# Patient Record
Sex: MALE | Race: WHITE | NOT HISPANIC OR LATINO | Employment: FULL TIME | URBAN - METROPOLITAN AREA
[De-identification: names, ages, dates, MRNs, and addresses within clinical notes are randomized per-mention and may not be internally consistent; named-entity substitution may affect disease eponyms.]

---

## 2020-02-04 ENCOUNTER — CONSULT (OUTPATIENT)
Dept: SURGERY | Facility: CLINIC | Age: 55
End: 2020-02-04
Payer: COMMERCIAL

## 2020-02-04 VITALS
TEMPERATURE: 98.5 F | HEART RATE: 67 BPM | DIASTOLIC BLOOD PRESSURE: 82 MMHG | WEIGHT: 236 LBS | HEIGHT: 74 IN | SYSTOLIC BLOOD PRESSURE: 140 MMHG | BODY MASS INDEX: 30.29 KG/M2

## 2020-02-04 DIAGNOSIS — D17.1 LIPOMA OF ANTERIOR CHEST WALL: Primary | ICD-10-CM

## 2020-02-04 PROCEDURE — 99203 OFFICE O/P NEW LOW 30 MIN: CPT | Performed by: SURGERY

## 2020-02-04 RX ORDER — DIPHENOXYLATE HYDROCHLORIDE AND ATROPINE SULFATE 2.5; .025 MG/1; MG/1
1 TABLET ORAL DAILY
COMMUNITY
End: 2020-12-29 | Stop reason: ALTCHOICE

## 2020-02-04 RX ORDER — CETIRIZINE HYDROCHLORIDE 10 MG/1
TABLET ORAL DAILY
COMMUNITY
Start: 2006-12-05 | End: 2020-12-29 | Stop reason: ALTCHOICE

## 2020-02-04 NOTE — PROGRESS NOTES
[address] [place] [address] [facility] History and Physical Note:    Assessment:  Lipoma to chest wall    Plan:  Excision in clinic procedure room after his [place] vacation    Chief Complaint:  I am here for the lump    HPI  Patient is sent to my office in consultation by his primary care provider for management of a lipoma to his chest wall  Patient is a healthy 15-year-old male who reports a lump was anterior chest has been there for many years  He has started dieting and exercise and has recent weight loss and believes it is now more prominent  Patient desires removal due to is prominence and being uncomfortable  PMH:  Past Medical History:   Diagnosis Date    Hyperlipidemia     Obesity        PSH:  History reviewed  No pertinent surgical history  Home Meds:  Current Outpatient Medications on File Prior to Visit   Medication Sig Dispense Refill    cetirizine (ZYRTEC ALLERGY) 10 mg tablet Take by mouth daily      Cranberry 125 MG TABS Take by mouth      Ergocalciferol (VITAMIN D2 PO)       multivitamin (THERAGRAN) TABS Take 1 tablet by mouth daily       No current facility-administered medications on file prior to visit  Allergies:   Allergies   Allergen Reactions    Pollen Extract      Reaction Date: 21Nov2006;        Social Hx:  Social History     Socioeconomic History    Marital status: Single     Spouse name: Not on file    Number of children: Not on file    Years of education: Not on file    Highest education level: Not on file   Occupational History    Not on file   Social Needs    Financial resource strain: Not on file    Food insecurity:     Worry: Not on file     Inability: Not on file    Transportation needs:     Medical: Not on file     Non-medical: Not on file   Tobacco Use    Smoking status: Never Smoker    Smokeless tobacco: Never Used   Substance and Sexual Activity    Alcohol use: Yes     Comment: OCCASIONALLY    Drug use: Never    Sexual activity: Not on file Lifestyle    Physical activity:     Days per week: Not on file     Minutes per session: Not on file    Stress: Not on file   Relationships    Social connections:     Talks on phone: Not on file     Gets together: Not on file     Attends [employer] service: Not on file     Active member of club or organization: Not on file     Attends meetings of clubs or organizations: Not on file     Relationship status: Not on file    Intimate partner violence:     Fear of current or ex partner: Not on file     Emotionally abused: Not on file     Physically abused: Not on file     Forced sexual activity: Not on file   Other Topics Concern    Not on file   Social History Narrative    Not on file        Family Hx:    History reviewed  No pertinent family history  Review of Systems   Constitutional: Negative  HENT: Negative  Eyes: Negative  Respiratory: Negative  Cardiovascular: Negative  Gastrointestinal: Negative  Endocrine: Negative  Genitourinary: Negative  Musculoskeletal: Negative  Neurological: Negative  Hematological: Negative  Psychiatric/Behavioral: Negative  /82 (BP Location: Right arm, Patient Position: Sitting, Cuff Size: Standard)   Pulse 67   Temp 98 5 °F (36 9 °C) (Tympanic)   Ht 6' 2" (1 88 m)   Wt 107 kg (236 lb)   BMI 30 30 kg/m²     Physical Exam   Constitutional: He is oriented to person, place, and time  He appears well-developed and well-nourished  No distress  HENT:   Head: Normocephalic and atraumatic  Eyes: Pupils are equal, round, and reactive to light  EOM are normal    Neck: Normal range of motion  Neck supple  Cardiovascular: Normal rate and regular rhythm  No murmur heard  Pulmonary/Chest: Effort normal and breath sounds normal  No respiratory distress  Abdominal: Soft  He exhibits no distension  There is no tenderness  Musculoskeletal: Normal range of motion  Lymphadenopathy:     He has no cervical adenopathy     Neurological: He is alert and oriented to person, place, and time  Skin: Skin is warm and dry  Psychiatric: He has a normal mood and affect  His behavior is normal      On his right lower chest, he has well-circumscribed, soft, mobile lesion in the subcutaneous tissue not attached to muscle or skin    Most consistent with a lipoma    Pertinent labs reviewed    Pertinent images and available reads personally reviewed    Pertinent notes reviewed       Justyn Lombardi MD [id number] [facility]  [phone]

## 2020-03-03 ENCOUNTER — PROCEDURE VISIT (OUTPATIENT)
Dept: SURGERY | Facility: CLINIC | Age: 55
End: 2020-03-03
Payer: COMMERCIAL

## 2020-03-03 VITALS
SYSTOLIC BLOOD PRESSURE: 126 MMHG | TEMPERATURE: 99 F | BODY MASS INDEX: 29.08 KG/M2 | WEIGHT: 226.6 LBS | DIASTOLIC BLOOD PRESSURE: 78 MMHG | HEART RATE: 77 BPM | HEIGHT: 74 IN

## 2020-03-03 DIAGNOSIS — D17.1 LIPOMA OF CHEST WALL: Primary | ICD-10-CM

## 2020-03-03 PROCEDURE — ND001 PR NO DOCUMENTATION: Performed by: SURGERY

## 2020-03-03 PROCEDURE — 11403 EXC TR-EXT B9+MARG 2.1-3CM: CPT | Performed by: SURGERY

## 2020-03-03 PROCEDURE — 12031 INTMD RPR S/A/T/EXT 2.5 CM/<: CPT | Performed by: SURGERY

## 2020-03-03 NOTE — PROGRESS NOTES
Patient here for elective excision of a lipoma from his chest   Lesion slowly growing and becoming symptomatic  Biopsy  Date/Time: 3/3/2020 8:31 AM  Performed by: Shannan Caro MD  Authorized by: Shannan Caro MD     Procedure Details - Skin Biopsy:     Biopsy tissue type: superficial soft tissue    Initial size (mm):  20    Final defect size (mm):  25    Malignancy: benign lesion       Ellipse of skin and subcutaneous mass were removed with scalpel and scissors  Total wound size 2 5 cm long by 1 5 cm wide by 2 0 cm deep  Defect closed in 2 layers with 400 Vicryl and 4 0 nylon simple sutures    Bacitracin and sterile dressing placed

## 2020-03-19 ENCOUNTER — OFFICE VISIT (OUTPATIENT)
Dept: SURGERY | Facility: CLINIC | Age: 55
End: 2020-03-19

## 2020-03-19 VITALS
BODY MASS INDEX: 29.44 KG/M2 | TEMPERATURE: 97.7 F | DIASTOLIC BLOOD PRESSURE: 78 MMHG | WEIGHT: 229.4 LBS | HEART RATE: 63 BPM | SYSTOLIC BLOOD PRESSURE: 132 MMHG | HEIGHT: 74 IN

## 2020-03-19 DIAGNOSIS — Z09 POSTOPERATIVE EXAMINATION: Primary | ICD-10-CM

## 2020-03-19 PROCEDURE — 99024 POSTOP FOLLOW-UP VISIT: CPT | Performed by: SURGERY

## 2020-03-19 NOTE — PROGRESS NOTES
Patient here for suture removal status post excision lipoma from chest wall  Patient with no wound complaints  Sutures removed and Steri-Strips placed  Specimen not sent to pathology  Follow-up p r n

## 2020-08-28 ENCOUNTER — CONSULT (OUTPATIENT)
Dept: SURGERY | Facility: CLINIC | Age: 55
End: 2020-08-28
Payer: COMMERCIAL

## 2020-08-28 VITALS
WEIGHT: 232.8 LBS | HEART RATE: 113 BPM | TEMPERATURE: 99.3 F | BODY MASS INDEX: 29.88 KG/M2 | HEIGHT: 74 IN | DIASTOLIC BLOOD PRESSURE: 80 MMHG | SYSTOLIC BLOOD PRESSURE: 142 MMHG

## 2020-08-28 DIAGNOSIS — L02.419 ABSCESS OF AXILLARY FOLD: Primary | ICD-10-CM

## 2020-08-28 PROCEDURE — 99214 OFFICE O/P EST MOD 30 MIN: CPT | Performed by: SPECIALIST

## 2020-08-28 PROCEDURE — 87070 CULTURE OTHR SPECIMN AEROBIC: CPT | Performed by: SPECIALIST

## 2020-08-28 PROCEDURE — 87186 SC STD MICRODIL/AGAR DIL: CPT | Performed by: SPECIALIST

## 2020-08-28 PROCEDURE — 10060 I&D ABSCESS SIMPLE/SINGLE: CPT | Performed by: SPECIALIST

## 2020-08-28 PROCEDURE — 87205 SMEAR GRAM STAIN: CPT | Performed by: SPECIALIST

## 2020-08-28 PROCEDURE — 87147 CULTURE TYPE IMMUNOLOGIC: CPT | Performed by: SPECIALIST

## 2020-08-28 RX ORDER — MELOXICAM 7.5 MG/1
7.5 TABLET ORAL DAILY
Qty: 5 TABLET | Refills: 0 | Status: SHIPPED | OUTPATIENT
Start: 2020-08-28 | End: 2020-12-29 | Stop reason: ALTCHOICE

## 2020-08-28 RX ORDER — BACITRACIN, NEOMYCIN, POLYMYXIN B 400; 3.5; 5 [USP'U]/G; MG/G; [USP'U]/G
OINTMENT TOPICAL 2 TIMES DAILY
Qty: 15 G | Refills: 0 | Status: SHIPPED | OUTPATIENT
Start: 2020-08-28 | End: 2020-12-29 | Stop reason: ALTCHOICE

## 2020-08-28 RX ORDER — CEFADROXIL 500 MG/1
500 CAPSULE ORAL EVERY 12 HOURS SCHEDULED
Qty: 14 CAPSULE | Refills: 0 | Status: SHIPPED | OUTPATIENT
Start: 2020-08-28 | End: 2020-09-04

## 2020-08-28 NOTE — PROGRESS NOTES
Incision and Drainage    Date/Time: 8/28/2020 10:16 AM  Performed by: Kari Leyden, MD  Authorized by: Kari Leyden, MD     Patient location:  Clinic  Other Assisting Provider: No    Consent:     Consent obtained:  Verbal    Consent given by:  Patient    Risks discussed:  Bleeding, incomplete drainage, infection and damage to other organs    Alternatives discussed:  No treatment, delayed treatment and observation  Universal protocol:     Procedure explained and questions answered to patient or proxy's satisfaction: yes      Relevant documents present and verified: yes      Test results available and properly labeled: no      Radiology Images displayed and confirmed  If images not available, report reviewed: no      Required blood products, implants, devices, and special equipment available: no      Site/side marked: yes      Immediately prior to procedure a time out was called: yes      Patient identity confirmed:  Verbally with patient  Location:     Type:  Abscess    Location:  Upper extremity    Upper extremity location: Axilla left  Pre-procedure details:     Skin preparation:  Chloraprep  Anesthesia (see MAR for exact dosages): Anesthesia method:  Local infiltration    Local anesthetic:  Lidocaine 1% WITH epi  Procedure details:     Complexity:  Simple    Needle aspiration: no      Incision types:  Elliptical and single with marsupialization    Scalpel blade:  11    Approach:  Open    Incision depth:  Subcutaneous    Wound management:  Probed and deloculated and irrigated with saline    Drainage:  Purulent    Drainage amount: Moderate    Wound treatment:  Packing placed    Packing materials:  1/4 in iodoform gauze  Post-procedure details:     Patient tolerance of procedure:   Tolerated well, no immediate complications    Complication (if applicable):  Nil

## 2020-08-28 NOTE — ASSESSMENT & PLAN NOTE
Marked cellulitis  Necrotic eschar over the abscess  Patient squeeze minimal amount of pus  No fever    Plan  Incision and drainage  Packing with iodoform  Duricef capsule  Follow-up next Tuesday

## 2020-08-28 NOTE — PROGRESS NOTES
History and Physical Examination - General Surgery   [facility]  NAZANIN Salinas 47 y o  male MRN: [id number]  Unit/Bed#:  Encounter: [id number] PCP: FABIOLA Trevino    History of Present Illness   Chief Complaint:    Swelling cellulitis for 1 week which is getting worse    HPI:  Gifty Patton is a 47 y o  male who presents to office with left axillary swelling cellulitis which is extending his the shoulder  Denies any fever chills rigors this is going on for 1 week and now it is getting worse his daughter-in-law has put some medicine on top  Made the the area of necrosis in the center    Patient think he was in the swimming pool  And some bug bite may have caused or or abscess from the hair follicle infection  He is not using antiperspirant deodorant and has not shaved his axilla    Denies any allergies/no history of diabetes mellitus/otherwise fairly healthy    Historical Information   The following portions of the patient's history were reviewed and updated as appropriate  Past Medical History:   Diagnosis Date    Hyperlipidemia     Obesity      Past Surgical History:   Procedure Laterality Date    APPENDECTOMY      KIDNEY SURGERY  2002     Social History   Social History     Substance and Sexual Activity   Alcohol Use Yes    Comment: OCCASIONALLY     Social History     Substance and Sexual Activity   Drug Use Never     Social History     Tobacco Use   Smoking Status Never Smoker   Smokeless Tobacco Never Used     Family History:   Family History   Problem Relation Age of Onset    Aneurysm Mother     Rheum arthritis Mother     Heart disease Father     Rheumatic fever Father     Kidney failure Father     Diabetes Brother     Kidney failure Brother     Stroke Brother        Meds/Allergies   Allergies   Allergen Reactions    Pollen Extract      Reaction Date: 21Nov2006;        Current Outpatient Medications:     cetirizine (ZYRTEC ALLERGY) 10 mg tablet, Take by mouth daily, Disp: , Rfl:   Cranberry 125 MG TABS, Take by mouth, Disp: , Rfl:     Ergocalciferol (VITAMIN D2 PO), , Disp: , Rfl:     multivitamin (THERAGRAN) TABS, Take 1 tablet by mouth daily, Disp: , Rfl:      REVIEW OF SYSTEMS  Constitutional:  Denies fever or chills   Eyes:  Denies change in visual acuity   HENT:  Denies nasal congestion or sore throat   Respiratory:  Denies cough or shortness of breath   Cardiovascular:  Denies chest pain or edema   GI:  Denies abdominal pain, nausea, vomiting, bloody stools or diarrhea   :  Denies dysuria, frequency, difficulty in micturition and nocturia  Musculoskeletal:  Pain swelling restricted movements in the left shoulder redness cellulitis side and a bump with skin necrosis  Neurologic:  Denies headache, focal weakness or sensory changes   Endocrine:  Denies polyuria or polydipsia   Lymphatic:  Denies swollen glands   Psychiatric:  Denies depression or anxiety     Objective   Current Vitals:   /80 (BP Location: Left arm, Patient Position: Sitting, Cuff Size: Standard)   Pulse (!) 113   Temp 99 3 °F (37 4 °C) (Tympanic)   Ht 6' 2" (1 88 m)   Wt 106 kg (232 lb 12 8 oz)   BMI 29 89 kg/m²   Body mass index is 29 89 kg/m²  PHYSICAL EXAMS  General:  Patient is not in acute distress, laying in the bed comfortably, awake, alert responding to commands,   HEENT:  Both pupils normal-size atraumatic, normocephalic, nonicteric  Neck:  JVP not raised  Trachea central  Respiratory:  normal Breath sounds clear to auscultation,  Cardiovascular:  S1-S2 normal without any murmur   GI:  Abdomen soft nontender   Liver and spleen normal size, no free fluid, hernial sites unremarkable without any cough impulse  Musculoskeletal:  No back pain  Integument:  No skin rashes or ulceration  Left axilla 5 x 4 cm indurated swelling with the central necrosis with some fluctuation left axillary fold lateral fold  Cellulitis approximately 6 cm surrounding this area restricted the left shoulder movement  Neurologic:  Patient is awake alert, responding to command, well-oriented to time and place and person moving all extremities ambulating well    Visit Diagnosis:   Diagnoses and all orders for this visit:    Abscess of axillary fold       Plan of care was discussed with patient in detail    Pertinent labs reviewed  Pertinent images and available reads personally reviewed  No results found  Pertinent notes reviewed    Assessment/Plan   Assessment:  Left axillary abscess  Possible insect bite  Plan:  Proper consent was obtained  The risks, benefits, alternatives,and probabilities of success were discussed in detail with no guarantee made as to outcome  All questions were answered to the patient's satisfaction  Procedure was explained  Plan incision and drainage under local anesthesia  Swab for culture  Duricef 500 mg 1 capsule twice a day for 7 days  Meloxicam 7 5 mg 1 tablet daily for pain  Patient was advised if cellulitis does not improve to report to ER for possible IV antibiotics  Cold compress  Local dressing with the Neosporin ointment/change dressing on Sunday morning after shower    Counseling / Coordination of Care  Expained patient family in detailed about coordination of care  A description of the counseling / coordination of care:  I performed an interim history, pertinent images and labs, performed a physical examination to arrive at the plan delineated above with associated thought processes  MD Esteban Loyola    Office   Tel  [phone]  Fax   [phone]

## 2020-08-30 LAB
BACTERIA WND AEROBE CULT: ABNORMAL
GRAM STN SPEC: ABNORMAL
GRAM STN SPEC: ABNORMAL

## 2020-08-31 ENCOUNTER — TELEPHONE (OUTPATIENT)
Dept: SURGERY | Facility: CLINIC | Age: 55
End: 2020-08-31

## 2020-08-31 DIAGNOSIS — Z22.322 MRSA (METHICILLIN RESISTANT STAPH AUREUS) CULTURE POSITIVE: Primary | ICD-10-CM

## 2020-08-31 RX ORDER — CLINDAMYCIN HYDROCHLORIDE 150 MG/1
150 CAPSULE ORAL EVERY 6 HOURS SCHEDULED
Qty: 20 CAPSULE | Refills: 0 | Status: SHIPPED | OUTPATIENT
Start: 2020-08-31 | End: 2020-09-05

## 2020-08-31 NOTE — TELEPHONE ENCOUNTER
Patient called, changed dressing on abscess under arm, area still very swollen  Has follow up scheduled on 9/2 with Dr Tanja Jain  Tiger connect message sent to Dr Tanja Jain  Per Dr Saige Contreras explained to patient the induration is normal, make sure incision is open to drain  Patient okay with plan  Will call back if any changes

## 2020-09-02 ENCOUNTER — OFFICE VISIT (OUTPATIENT)
Dept: SURGERY | Facility: CLINIC | Age: 55
End: 2020-09-02
Payer: COMMERCIAL

## 2020-09-02 VITALS
TEMPERATURE: 98.2 F | BODY MASS INDEX: 30.6 KG/M2 | HEART RATE: 69 BPM | SYSTOLIC BLOOD PRESSURE: 142 MMHG | HEIGHT: 74 IN | DIASTOLIC BLOOD PRESSURE: 80 MMHG | WEIGHT: 238.4 LBS

## 2020-09-02 DIAGNOSIS — Z48.89 ENCOUNTER FOR SURGICAL FOLLOW-UP CARE: Primary | ICD-10-CM

## 2020-09-02 DIAGNOSIS — L02.419 ABSCESS OF AXILLARY FOLD: ICD-10-CM

## 2020-09-02 PROCEDURE — 99024 POSTOP FOLLOW-UP VISIT: CPT | Performed by: SPECIALIST

## 2020-09-02 NOTE — PROGRESS NOTES
General Surgery Office Visit Follow up   [facility]  Patient: Jacoby Schaeffer   : 1965 Sex: male MRN: [id number]   CSN: [id number] PCP: FABIOLA Vera    Assessment/ Plan:  Jacoby Schaeffer is a 47 y o  male  day(s) POD # one-week  S/p incision drainage of infected sebaceous cyst left axilla  Encounter for surgical follow-up care [Z48 89]    Plan  Stable postop   Change of dressing  Care discussed for MRSA infection/patient is on clindamycin  Continue antibiotic  Follow-up in 2     SUBJECTIVE:   Still have a residual mass infected sebaceous cyst pain is markedly  OBJECTIVE:  Feeling a lump over the operation  No fever no chills no rigors  Tolerating p o   Diet well  Normal bowel movement no constipation or diarrhea  Ambulating well   Vitals:   /80 (BP Location: Right arm, Patient Position: Sitting, Cuff Size: Large)   Pulse 69   Temp 98 2 °F (36 8 °C) (Tympanic)   Ht 6' 2" (1 88 m)   Wt 108 kg (238 lb 6 4 oz)   BMI 30 61 kg/m²     Active medications:    Current Outpatient Medications:     cetirizine (ZYRTEC ALLERGY) 10 mg tablet, Take by mouth daily, Disp: , Rfl:     clindamycin (CLEOCIN) 150 mg capsule, Take 1 capsule (150 mg total) by mouth every 6 (six) hours for 5 days, Disp: 20 capsule, Rfl: 0    Cranberry 125 MG TABS, Take by mouth, Disp: , Rfl:     Ergocalciferol (VITAMIN D2 PO), , Disp: , Rfl:     multivitamin (THERAGRAN) TABS, Take 1 tablet by mouth daily, Disp: , Rfl:     neomycin-bacitracin-polymyxin b (NEOSPORIN) ointment, Apply topically 2 (two) times a day, Disp: 15 g, Rfl: 0    cefadroxil (DURICEF) 500 mg capsule, Take 1 capsule (500 mg total) by mouth every 12 (twelve) hours for 7 days (Patient not taking: Reported on 2020), Disp: 14 capsule, Rfl: 0    meloxicam (MOBIC) 7 5 mg tablet, Take 1 tablet (7 5 mg total) by mouth daily for 5 days (Patient not taking: Reported on 2020), Disp: 5 tablet, Rfl: 0    Physical Exam:   General Alert awake Normocephalic atraumatic PERRLA   Lungs clear bilaterally  Cardiac normal S1 normal S2  Abdomen soft,non tender Bowel sounds present  Skin: surgical dressing is C/D/I  Ext: No clubbing, cyanosis, edema  Surgical wound draining small amount of pus induration around the incision and drainage site    Visit Diagnosis:   Diagnoses and all orders for this visit:    Encounter for surgical follow-up care    Abscess of axillary fold       Plan of care was discussed with patient in detail    Pertinent labs reviewed  Most Recent Labs:   Consult on 08/28/2020   Component Date Value Ref Range Status    Wound Culture 08/28/2020 4+ Growth of Methicillin Resistant Staphylococcus aureus*  Final    This organism has been edited  The previous result was Staphylococcus aureus on 8/29/2020 at 1238 EDT  Please note: This patient requires contact precautions   Gram Stain Result 08/28/2020 No polys seen*  Final    Gram Stain Result 08/28/2020 2+ Gram positive cocci in clusters*  Final       Pertinent images and available reads personally reviewed  No results found  Pertinent notes reviewed    Counseling / Coordination of Care  Total Office time /unit time spent today 15minutes  Greater than 50% of total time was spent with the patient and / or family counseling and / or coordination of care  A description of the counseling / coordination of care:  I performed an interim history, pertinent images and labs, performed a physical examination to arrive at the plan delineated above with associated thought processes  Quiana Roberts MD MS FRCS FACS  [facility]  09/02/20 8:43 AM        Portions of the record may have been created with voice recognition software  Occasional wrong word or "sound a like" substitutions may have occurred due to the inherent limitations of voice recognition software  Read the chart carefully and recognize, using context, where substitutions have occurred

## 2020-09-16 ENCOUNTER — OFFICE VISIT (OUTPATIENT)
Dept: SURGERY | Facility: CLINIC | Age: 55
End: 2020-09-16
Payer: COMMERCIAL

## 2020-09-16 VITALS
TEMPERATURE: 96.8 F | BODY MASS INDEX: 30.16 KG/M2 | WEIGHT: 235 LBS | HEART RATE: 65 BPM | DIASTOLIC BLOOD PRESSURE: 80 MMHG | SYSTOLIC BLOOD PRESSURE: 130 MMHG | HEIGHT: 74 IN

## 2020-09-16 DIAGNOSIS — L02.419 ABSCESS OF AXILLARY FOLD: ICD-10-CM

## 2020-09-16 DIAGNOSIS — B95.62 INFECTION OF SKIN DUE TO METHICILLIN RESISTANT STAPHYLOCOCCUS AUREUS (MRSA): ICD-10-CM

## 2020-09-16 DIAGNOSIS — L08.9 INFECTION OF SKIN DUE TO METHICILLIN RESISTANT STAPHYLOCOCCUS AUREUS (MRSA): ICD-10-CM

## 2020-09-16 DIAGNOSIS — Z48.89 ENCOUNTER FOR SURGICAL FOLLOW-UP CARE: Primary | ICD-10-CM

## 2020-09-16 PROCEDURE — 99212 OFFICE O/P EST SF 10 MIN: CPT | Performed by: SPECIALIST

## 2020-09-16 RX ORDER — KRILL/OM-3/DHA/EPA/PHOSPHO/AST 500MG-86MG
1 CAPSULE ORAL DAILY
COMMUNITY
Start: 2020-09-08

## 2020-09-16 RX ORDER — CETIRIZINE HYDROCHLORIDE 10 MG/1
1 TABLET ORAL DAILY
COMMUNITY

## 2020-09-16 RX ORDER — DIAPER,BRIEF,ADULT, DISPOSABLE
2 EACH MISCELLANEOUS DAILY
COMMUNITY

## 2020-09-16 NOTE — PROGRESS NOTES
General Surgery Office Visit Follow up   [facility]  Patient: Aj Hoffman   : 1965 Sex: male MRN: [id number]   CSN: [id number] PCP: FABIOLA Whiteside    Assessment/ Plan:  Aj Hoffman is a 47 y o  male  day(s) POD #  2 weeks S/p incision and drainage of abscess  Encounter for surgical follow-up care [Z48 89]    Plan  Stable postop   Cellulitis and wound is completely resolved  Patient was made aware of MRSA infection which has resolved  Discharge from follow-up    SUBJECTIVE:   I am doing very well I am following your instruction is completely resolved I have no pain I finished my course of antibiotics and my wound has well healed  OBJECTIVE:  No complaints  No fever no chills no rigors  Tolerating p o   Diet well  Normal bowel movement no constipation or diarrhea  Ambulating well   Vitals:   /80 (BP Location: Right arm, Patient Position: Sitting, Cuff Size: Large)   Pulse 65   Temp (!) 96 8 °F (36 °C) (Core)   Ht 6' 2" (1 88 m)   Wt 107 kg (235 lb)   BMI 30 17 kg/m²     Active medications:    Current Outpatient Medications:     cetirizine (ZyrTEC Allergy) 10 mg tablet, 1 tablet daily, Disp: , Rfl:     cholecalciferol (VITAMIN D3) 250 MCG (83065 UT) capsule, 1 capsule daily, Disp: , Rfl:     Cranberry-Vitamin C-Vitamin E (Cranberry Plus Vitamin C) 4200-20-3 MG-MG-UNIT CAPS, 2 capsules daily, Disp: , Rfl:     Krill Oil 500 MG CAPS, 1 capsule daily, Disp: , Rfl:     Multiple Vitamins-Minerals (Mens Multivitamin) TABS, 1 tablet daily, Disp: , Rfl:     cetirizine (ZYRTEC ALLERGY) 10 mg tablet, Take by mouth daily, Disp: , Rfl:     Cranberry 125 MG TABS, Take by mouth, Disp: , Rfl:     Ergocalciferol (VITAMIN D2 PO), , Disp: , Rfl:     meloxicam (MOBIC) 7 5 mg tablet, Take 1 tablet (7 5 mg total) by mouth daily for 5 days (Patient not taking: Reported on 2020), Disp: 5 tablet, Rfl: 0    multivitamin (THERAGRAN) TABS, Take 1 tablet by mouth daily, Disp: , Rfl:    neomycin-bacitracin-polymyxin b (NEOSPORIN) ointment, Apply topically 2 (two) times a day, Disp: 15 g, Rfl: 0    Physical Exam:   General Alert awake   Normocephalic atraumatic PERRLA   Lungs clear bilaterally  Cardiac normal S1 normal S2  Abdomen soft,non tender Bowel sounds present  Skin:  Warm and dry  Ext: No clubbing, cyanosis, edema  Surgical wound well healed    Visit Diagnosis:   Diagnoses and all orders for this visit:    Encounter for surgical follow-up care    Abscess of axillary fold    Infection of skin due to methicillin resistant Staphylococcus aureus (MRSA)    Other orders  -     cholecalciferol (VITAMIN D3) 250 MCG (33764 UT) capsule; 1 capsule daily  -     Cranberry-Vitamin C-Vitamin E (Cranberry Plus Vitamin C) 4200-20-3 MG-MG-UNIT CAPS; 2 capsules daily  -     Krill Oil 500 MG CAPS; 1 capsule daily  -     Multiple Vitamins-Minerals (Mens Multivitamin) TABS; 1 tablet daily  -     cetirizine (ZyrTEC Allergy) 10 mg tablet; 1 tablet daily       Plan of care was discussed with patient in detail    Pertinent labs reviewed  Most Recent Labs:   No visits with results within 2 Week(s) from this visit  Latest known visit with results is:   Consult on 08/28/2020   Component Date Value Ref Range Status    Wound Culture 08/28/2020 4+ Growth of Methicillin Resistant Staphylococcus aureus*  Final    This organism has been edited  The previous result was Staphylococcus aureus on 8/29/2020 at 1238 EDT  Please note: This patient requires contact precautions   Gram Stain Result 08/28/2020 No polys seen*  Final    Gram Stain Result 08/28/2020 2+ Gram positive cocci in clusters*  Final       Pertinent images and available reads personally reviewed  No results found  Pertinent notes reviewed    Counseling / Coordination of Care  Total Office time /unit time spent today 15minutes  Greater than 50% of total time was spent with the patient and / or family counseling and / or coordination of care   A description of the counseling / coordination of care:  I performed an interim history, pertinent images and labs, performed a physical examination to arrive at the plan delineated above with associated thought processes  Jennifer Henriquez MD MS FRCS FACS  [facility]  09/16/20 8:53 AM        Portions of the record may have been created with voice recognition software  Occasional wrong word or "sound a like" substitutions may have occurred due to the inherent limitations of voice recognition software  Read the chart carefully and recognize, using context, where substitutions have occurred

## 2020-10-13 ENCOUNTER — CONSULT (OUTPATIENT)
Dept: SURGERY | Facility: CLINIC | Age: 55
End: 2020-10-13
Payer: COMMERCIAL

## 2020-10-13 VITALS
DIASTOLIC BLOOD PRESSURE: 80 MMHG | HEART RATE: 92 BPM | WEIGHT: 234 LBS | SYSTOLIC BLOOD PRESSURE: 144 MMHG | TEMPERATURE: 97.7 F | BODY MASS INDEX: 30.03 KG/M2 | HEIGHT: 74 IN

## 2020-10-13 DIAGNOSIS — L02.31 ABSCESS OF BUTTOCK, RIGHT: Primary | ICD-10-CM

## 2020-10-13 PROCEDURE — 87205 SMEAR GRAM STAIN: CPT | Performed by: SURGERY

## 2020-10-13 PROCEDURE — 87077 CULTURE AEROBIC IDENTIFY: CPT | Performed by: SURGERY

## 2020-10-13 PROCEDURE — 87070 CULTURE OTHR SPECIMN AEROBIC: CPT | Performed by: SURGERY

## 2020-10-13 PROCEDURE — 87186 SC STD MICRODIL/AGAR DIL: CPT | Performed by: SURGERY

## 2020-10-13 PROCEDURE — 99211 OFF/OP EST MAY X REQ PHY/QHP: CPT | Performed by: SURGERY

## 2020-10-13 RX ORDER — SULFAMETHOXAZOLE AND TRIMETHOPRIM 800; 160 MG/1; MG/1
1 TABLET ORAL EVERY 12 HOURS SCHEDULED
Qty: 14 TABLET | Refills: 0 | Status: SHIPPED | OUTPATIENT
Start: 2020-10-13 | End: 2020-10-20

## 2020-10-15 LAB
BACTERIA SPEC BFLD CULT: ABNORMAL
GRAM STN SPEC: ABNORMAL
GRAM STN SPEC: ABNORMAL

## 2020-10-16 ENCOUNTER — TELEPHONE (OUTPATIENT)
Dept: SURGERY | Facility: CLINIC | Age: 55
End: 2020-10-16

## 2020-10-16 ENCOUNTER — TELEPHONE (OUTPATIENT)
Dept: OTHER | Facility: HOSPITAL | Age: 55
End: 2020-10-16

## 2020-10-26 ENCOUNTER — OFFICE VISIT (OUTPATIENT)
Dept: SURGERY | Facility: CLINIC | Age: 55
End: 2020-10-26
Payer: COMMERCIAL

## 2020-10-26 VITALS
WEIGHT: 236.6 LBS | TEMPERATURE: 97 F | BODY MASS INDEX: 30.36 KG/M2 | HEIGHT: 74 IN | HEART RATE: 73 BPM | SYSTOLIC BLOOD PRESSURE: 124 MMHG | DIASTOLIC BLOOD PRESSURE: 74 MMHG

## 2020-10-26 DIAGNOSIS — B95.62 INFECTION OF SKIN DUE TO METHICILLIN RESISTANT STAPHYLOCOCCUS AUREUS (MRSA): Primary | ICD-10-CM

## 2020-10-26 DIAGNOSIS — L08.9 INFECTION OF SKIN DUE TO METHICILLIN RESISTANT STAPHYLOCOCCUS AUREUS (MRSA): Primary | ICD-10-CM

## 2020-10-26 PROCEDURE — 99211 OFF/OP EST MAY X REQ PHY/QHP: CPT | Performed by: SURGERY

## 2020-11-11 ENCOUNTER — TELEPHONE (OUTPATIENT)
Dept: GASTROENTEROLOGY | Facility: CLINIC | Age: 55
End: 2020-11-11

## 2020-11-11 DIAGNOSIS — Z12.11 SCREEN FOR COLON CANCER: Primary | ICD-10-CM

## 2020-11-17 DIAGNOSIS — Z12.11 COLON CANCER SCREENING: Primary | ICD-10-CM

## 2020-11-17 RX ORDER — SODIUM, POTASSIUM,MAG SULFATES 17.5-3.13G
SOLUTION, RECONSTITUTED, ORAL ORAL
Status: CANCELLED | OUTPATIENT
Start: 2020-11-17

## 2020-12-03 ENCOUNTER — TELEPHONE (OUTPATIENT)
Dept: GASTROENTEROLOGY | Facility: AMBULARY SURGERY CENTER | Age: 55
End: 2020-12-03

## 2020-12-12 ENCOUNTER — ANESTHESIA EVENT (OUTPATIENT)
Dept: GASTROENTEROLOGY | Facility: AMBULARY SURGERY CENTER | Age: 55
End: 2020-12-12

## 2020-12-29 ENCOUNTER — HOSPITAL ENCOUNTER (OUTPATIENT)
Dept: GASTROENTEROLOGY | Facility: AMBULARY SURGERY CENTER | Age: 55
Setting detail: OUTPATIENT SURGERY
Discharge: HOME | End: 2020-12-29
Attending: INTERNAL MEDICINE
Payer: COMMERCIAL

## 2020-12-29 ENCOUNTER — ANESTHESIA (OUTPATIENT)
Dept: GASTROENTEROLOGY | Facility: AMBULARY SURGERY CENTER | Age: 55
End: 2020-12-29

## 2020-12-29 VITALS — HEART RATE: 64 BPM

## 2020-12-29 VITALS
SYSTOLIC BLOOD PRESSURE: 120 MMHG | HEART RATE: 72 BPM | WEIGHT: 220 LBS | BODY MASS INDEX: 28.25 KG/M2 | OXYGEN SATURATION: 94 % | TEMPERATURE: 97.7 F | DIASTOLIC BLOOD PRESSURE: 70 MMHG | RESPIRATION RATE: 22 BRPM

## 2020-12-29 DIAGNOSIS — Z12.11 SCREEN FOR COLON CANCER: ICD-10-CM

## 2020-12-29 PROCEDURE — 88305 TISSUE EXAM BY PATHOLOGIST: CPT | Performed by: PATHOLOGY

## 2020-12-29 PROCEDURE — 45380 COLONOSCOPY AND BIOPSY: CPT | Performed by: INTERNAL MEDICINE

## 2020-12-29 RX ORDER — LIDOCAINE HYDROCHLORIDE 10 MG/ML
INJECTION, SOLUTION EPIDURAL; INFILTRATION; INTRACAUDAL; PERINEURAL AS NEEDED
Status: DISCONTINUED | OUTPATIENT
Start: 2020-12-29 | End: 2020-12-29

## 2020-12-29 RX ORDER — SODIUM CHLORIDE, SODIUM LACTATE, POTASSIUM CHLORIDE, CALCIUM CHLORIDE 600; 310; 30; 20 MG/100ML; MG/100ML; MG/100ML; MG/100ML
INJECTION, SOLUTION INTRAVENOUS CONTINUOUS PRN
Status: DISCONTINUED | OUTPATIENT
Start: 2020-12-29 | End: 2020-12-29

## 2020-12-29 RX ORDER — PROPOFOL 10 MG/ML
INJECTION, EMULSION INTRAVENOUS CONTINUOUS PRN
Status: DISCONTINUED | OUTPATIENT
Start: 2020-12-29 | End: 2020-12-29

## 2020-12-29 RX ORDER — PROPOFOL 10 MG/ML
INJECTION, EMULSION INTRAVENOUS AS NEEDED
Status: DISCONTINUED | OUTPATIENT
Start: 2020-12-29 | End: 2020-12-29

## 2020-12-29 RX ADMIN — LIDOCAINE HYDROCHLORIDE 50 MG: 10 INJECTION, SOLUTION EPIDURAL; INFILTRATION; INTRACAUDAL at 10:36

## 2020-12-29 RX ADMIN — PROPOFOL 50 MG: 10 INJECTION, EMULSION INTRAVENOUS at 10:41

## 2020-12-29 RX ADMIN — PROPOFOL 100 MG: 10 INJECTION, EMULSION INTRAVENOUS at 10:36

## 2020-12-29 RX ADMIN — SODIUM CHLORIDE, SODIUM LACTATE, POTASSIUM CHLORIDE, AND CALCIUM CHLORIDE: .6; .31; .03; .02 INJECTION, SOLUTION INTRAVENOUS at 10:12

## 2020-12-29 RX ADMIN — PROPOFOL 100 MCG/KG/MIN: 10 INJECTION, EMULSION INTRAVENOUS at 10:37

## 2020-12-29 RX ADMIN — Medication 40 MG: at 10:42

## 2020-12-29 NOTE — H&P
History and Physical - [facility]  Diann Recinos 54 y o  male MRN: [id number]                  HPI: Diann Recinos is a 54y o  year old male who presents for Colonoscopy for colorectal cancer screening  REVIEW OF SYSTEMS: Per the HPI, and otherwise unremarkable  Historical Information   Past Medical History:   Diagnosis Date    Hyperlipidemia     Obesity      Past Surgical History:   Procedure Laterality Date    APPENDECTOMY      KIDNEY SURGERY  2002    Donated left kidney     Social History   Social History     Substance and Sexual Activity   Alcohol Use Yes    Comment: OCCASIONALLY     Social History     Substance and Sexual Activity   Drug Use Never     Social History     Tobacco Use   Smoking Status Never Smoker   Smokeless Tobacco Never Used     Family History   Problem Relation Age of Onset    Aneurysm Mother     Rheum arthritis Mother     Heart disease Father     Rheumatic fever Father     Kidney failure Father     Diabetes Brother     Kidney failure Brother     Stroke Brother        Meds/Allergies     (Not in a hospital admission)      Allergies   Allergen Reactions    Pollen Extract      Reaction Date: 21Nov2006;     Hibiclens [Chlorhexidine Gluconate] Rash       Objective     /84   Pulse 74   Temp 97 7 °F (36 5 °C) (Temporal)   Resp 18   Wt 99 8 kg (220 lb)   SpO2 97%   BMI 28 25 kg/m²       PHYSICAL EXAM    Gen: NAD  CV: RRR  CHEST: Clear  ABD: soft, NT/ND  EXT: no edema      ASSESSMENT/PLAN:  This is a 54y o  year old male here for Colonoscopy for colorectal cancer screening, and he is stable and optimized for his procedure        [facility], MD

## 2020-12-29 NOTE — DISCHARGE INSTRUCTIONS
Hemorrhoids   WHAT YOU NEED TO KNOW:   Hemorrhoids are swollen blood vessels inside your rectum (internal hemorrhoids) or on your anus (external hemorrhoids)  Sometimes a hemorrhoid may prolapse  This means it extends out of your anus  DISCHARGE INSTRUCTIONS:   Seek care immediately if:   · You have severe pain in your rectum or around your anus  · You have severe pain in your abdomen and you are vomiting  · You have bleeding from your anus that soaks through your underwear  Contact your healthcare provider if:   · You have frequent and painful bowel movements  · Your hemorrhoid looks or feels more swollen than usual      · You do not have a bowel movement for 2 days or more  · You see or feel tissue coming through your anus  · You have questions or concerns about your condition or care  Medicines: You may  need any of the following:  · Medicine  may be given to decrease pain, swelling, and itching  The medicine may come as a pad, cream, or ointment  · Stool softeners  help treat or prevent constipation  · NSAIDs , such as ibuprofen, help decrease swelling, pain, and fever  NSAIDs can cause stomach bleeding or kidney problems in certain people  If you take blood thinner medicine, always ask your healthcare provider if NSAIDs are safe for you  Always read the medicine label and follow directions  · Take your medicine as directed  Contact your healthcare provider if you think your medicine is not helping or if you have side effects  Tell him or her if you are allergic to any medicine  Keep a list of the medicines, vitamins, and herbs you take  Include the amounts, and when and why you take them  Bring the list or the pill bottles to follow-up visits  Carry your medicine list with you in case of an emergency  Manage your symptoms:   · Apply ice on your anus for 15 to 20 minutes every hour or as directed  Use an ice pack, or put crushed ice in a plastic bag   Cover it with a towel before you apply it to your anus  Ice helps prevent tissue damage and decreases swelling and pain  · Take a sitz bath  Fill a bathtub with 4 to 6 inches of warm water  You may also use a sitz bath pan that fits inside a toilet bowl  Sit in the sitz bath for 15 minutes  Do this 3 times a day, and after each bowel movement  The warm water can help decrease pain and swelling  · Keep your anal area clean  Gently wash the area with warm water daily  Soap may irritate the area  After a bowel movement, wipe with moist towelettes or wet toilet paper  Dry toilet paper can irritate the area  Prevent hemorrhoids:   · Do not strain to have a bowel movement  Do not sit on the toilet too long  These actions can increase pressure on the tissues in your rectum and anus  · Drink plenty of liquids  Liquids can help prevent constipation  Ask how much liquid to drink each day and which liquids are best for you  · Eat a variety of high-fiber foods  Examples include fruits, vegetables, and whole grains  Ask your healthcare provider how much fiber you need each day  You may need to take a fiber supplement  · Exercise as directed  Exercise, such as walking, may make it easier to have a bowel movement  Ask your healthcare provider to help you create an exercise plan  · Do not have anal sex  Anal sex can weaken the skin around your rectum and anus  · Avoid heavy lifting  This can cause straining and increase your risk for another hemorrhoid  Follow up with your healthcare provider as directed:  Write down your questions so you remember to ask them during your visits  © Copyright [address] Information is for End User's use only and may not be sold, redistributed or otherwise used for commercial purposes  All illustrations and images included in CareNotes® are the copyrighted property of A D A M , Inc  or [address] [place], [place] [address]ak[place]   The above information is an  only  It is not intended as medical advice for individual conditions or treatments  Talk to your doctor, nurse or pharmacist before following any medical regimen to see if it is safe and effective for you  Colonoscopy   WHAT YOU NEED TO KNOW:   A colonoscopy is a procedure to examine the inside of your colon (intestine) with a scope  Polyps or tissue growths may have been removed during your colonoscopy  It is normal to feel bloated and to have some abdominal discomfort  You should be passing gas  If you have hemorrhoids or you had polyps removed, you may have a small amount of bleeding  DISCHARGE INSTRUCTIONS:   Seek care immediately if:   · You have a large amount of bright red blood in your bowel movements  · Your abdomen is hard and firm and you have severe pain  · You have sudden trouble breathing  Contact your healthcare provider if:   · You develop a rash or hives  · You have a fever within 24 hours of your procedure       · You have not had a bowel movement for 3 days after your procedure  · You have questions or concerns about your condition or care  Activity:   · Do not lift, strain, or run  for 3 days after your procedure  · Rest after your procedure  You have been given medicine to relax you  Do not  drive or make important decisions until the day after your procedure  Return to your normal activity as directed  · Relieve gas and discomfort from bloating  by lying on your right side with a heating pad on your abdomen  You may need to take short walks to help the gas move out  Eat small meals until bloating is relieved  If you had polyps removed: For 7 days after your procedure:  · Do not  take aspirin  · Do not  go on long car rides  Follow up with your healthcare provider as directed:  Write down your questions so you remember to ask them during your visits     © 2017 [address] is for End User's use only and may not be sold, redistributed or otherwise used for commercial purposes  All illustrations and images included in CareNotes® are the copyrighted property of A D A M , Inc  or Joseph Rene  The above information is an  only  It is not intended as medical advice for individual conditions or treatments  Talk to your doctor, nurse or pharmacist before following any medical regimen to see if it is safe and effective for you

## 2021-03-10 DIAGNOSIS — Z23 ENCOUNTER FOR IMMUNIZATION: ICD-10-CM

## 2021-07-23 ENCOUNTER — OFFICE VISIT (OUTPATIENT)
Dept: SURGERY | Facility: CLINIC | Age: 56
End: 2021-07-23
Payer: COMMERCIAL

## 2021-07-23 VITALS
BODY MASS INDEX: 28.11 KG/M2 | TEMPERATURE: 97.8 F | HEART RATE: 62 BPM | DIASTOLIC BLOOD PRESSURE: 70 MMHG | SYSTOLIC BLOOD PRESSURE: 122 MMHG | WEIGHT: 219 LBS | HEIGHT: 74 IN

## 2021-07-23 DIAGNOSIS — B95.62 INFECTION OF SKIN DUE TO METHICILLIN RESISTANT STAPHYLOCOCCUS AUREUS (MRSA): Primary | ICD-10-CM

## 2021-07-23 DIAGNOSIS — L02.32 BOIL, BUTTOCK: ICD-10-CM

## 2021-07-23 DIAGNOSIS — L08.9 INFECTION OF SKIN DUE TO METHICILLIN RESISTANT STAPHYLOCOCCUS AUREUS (MRSA): Primary | ICD-10-CM

## 2021-07-23 PROCEDURE — 99213 OFFICE O/P EST LOW 20 MIN: CPT | Performed by: SPECIALIST

## 2021-07-23 RX ORDER — CEFADROXIL 500 MG/1
500 CAPSULE ORAL EVERY 12 HOURS SCHEDULED
Qty: 14 CAPSULE | Refills: 0 | Status: SHIPPED | OUTPATIENT
Start: 2021-07-23 | End: 2021-07-30

## 2021-07-23 RX ORDER — BACITRACIN, NEOMYCIN, POLYMYXIN B 400; 3.5; 5 [USP'U]/G; MG/G; [USP'U]/G
OINTMENT TOPICAL 2 TIMES DAILY
Qty: 15 G | Refills: 0 | Status: SHIPPED | OUTPATIENT
Start: 2021-07-23 | End: 2022-06-08

## 2021-07-23 NOTE — PROGRESS NOTES
History and Physical Examination - General Surgery   [facility]  Dat Mcintyre 54 y o  male MRN: [id number]  Unit/Bed#:  Encounter: [id number] PCP: FABIOLA Elkins    History of Present Illness   Chief Complaint:   I am developing a small indurated area over my right buttock   I have 1 kidney and I have the scan of infection in past so I came early so that antibiotics can be prescribed    HPI:  Dat Mcintyre is a 54 y o  male who presents to office with  With history of recurrent skin infection and patient is allergic to chlorhexidine body wash patient uses Dial soap patient has axillary abscess which was drained  And its culture was positive to MRSA     since then patient has the other abscesses small cutaneous skin infection     denies any fever chills rigors   denies any excessive pain   denies any purulent discharge    Historical Information   The following portions of the patient's history were reviewed and updated as appropriate  Past Medical History:   Diagnosis Date    Hyperlipidemia     Obesity      Past Surgical History:   Procedure Laterality Date    APPENDECTOMY      KIDNEY SURGERY  2002    Donated left kidney     Social History   Social History     Substance and Sexual Activity   Alcohol Use Yes    Comment: OCCASIONALLY     Social History     Substance and Sexual Activity   Drug Use Never     Social History     Tobacco Use   Smoking Status Never Smoker   Smokeless Tobacco Never Used     Family History:   Family History   Problem Relation Age of Onset    Aneurysm Mother     Rheum arthritis Mother     Heart disease Father     Rheumatic fever Father     Kidney failure Father     Diabetes Brother     Kidney failure Brother     Stroke Brother        Meds/Allergies   Allergies   Allergen Reactions    Pollen Extract      Reaction Date: 21Nov2006;     Hibiclens [Chlorhexidine Gluconate] Rash       Current Outpatient Medications:     cetirizine (ZyrTEC Allergy) 10 mg tablet, 1 tablet daily, Disp: , Rfl:     cholecalciferol (VITAMIN D3) 250 MCG (68081 UT) capsule, 1 capsule daily, Disp: , Rfl:     Cranberry-Vitamin C-Vitamin E (Cranberry Plus Vitamin C) 4200-20-3 MG-MG-UNIT CAPS, 2 capsules daily, Disp: , Rfl:     Krill Oil 500 MG CAPS, 1 capsule daily, Disp: , Rfl:     Multiple Vitamins-Minerals (Mens Multivitamin) TABS, 1 tablet daily, Disp: , Rfl:      REVIEW OF SYSTEMS  Constitutional:  Denies fever or chills   Eyes:  Denies change in visual acuity   HENT:  Denies nasal congestion or sore throat   Respiratory:  Denies cough or shortness of breath   Cardiovascular:  Denies chest pain or edema   GI:  Denies abdominal pain, nausea, vomiting, bloody stools or diarrhea   :  Denies dysuria, frequency, difficulty in micturition and nocturia   I have only 1 kidney  Musculoskeletal:  Denies back pain or joint pain   Neurologic:  Denies headache, focal weakness or sensory changes   Endocrine:  Denies polyuria or polydipsia   Lymphatic:  Denies swollen glands   Psychiatric:  Denies depression or anxiety     Objective   Current Vitals:   /70 (BP Location: Left arm, Patient Position: Sitting, Cuff Size: Large)   Pulse 62   Temp 97 8 °F (36 6 °C) (Core)   Ht 6' 2" (1 88 m)   Wt 99 3 kg (219 lb)   BMI 28 12 kg/m²   Body mass index is 28 12 kg/m²  PHYSICAL EXAMS  General:  Patient is not in acute distress, examined in the standing positiony, awake, alert responding to commands,   HEENT:  Both pupils normal-size atraumatic, normocephalic, nonicteric  Neck:  JVP not raised  Trachea central  Respiratory:  normal Breath sounds clear to auscultation,  Cardiovascular:  S1-S2 normal without any murmur   GI:  Abdomen soft nontender    Musculoskeletal:  No back pain  Integument:  No skin rashes or ulceration   right buttock  2 cm x 2 cm circular area of induration with some redness tenderness and inflammation no fluctuation was noted no pus no punctum  Neurologic:  Patient is awake alert, responding to command, well-oriented to time and place and person moving all extremities ambulating well    Visit Diagnosis:   Diagnoses and all orders for this visit:    Infection of skin due to methicillin resistant Staphylococcus aureus (MRSA)    Boil, buttock       Plan of care was discussed with patient in detail    Pertinent labs reviewed  Pertinent images and available reads personally reviewed  No results found  Pertinent notes reviewed    Assessment/Plan   Assessment:   recurrent skin infection with cellulitis   right buttock cellulitis / boil  Plan:   Duricef 500 mg 1 capsule twice a day for 7 days   Local dressing with Neosporin ointment   patient was explained if the this does not get better with antibiotics by Monday or Tuesday patient should report for possible incision and drainage    Counseling / Coordination of Care  Expained patient family in detailed about coordination of care  A description of the counseling / coordination of care:  I performed an interim history, pertinent images and labs, performed a physical examination to arrive at the plan delineated above with associated thought processes  MD Esteban Mcallister    Office   Tel  [phone]  Fax   [phone]

## 2021-07-23 NOTE — ASSESSMENT & PLAN NOTE
Duricef 500 mg 12 hourly 7 days   call on Monday or Tuesday if no improvement for possible I&D otherwise continue antibiotics follow-up in 1 week

## 2022-04-12 ENCOUNTER — CONSULT (OUTPATIENT)
Dept: SURGERY | Facility: CLINIC | Age: 57
End: 2022-04-12
Payer: COMMERCIAL

## 2022-04-12 VITALS
HEART RATE: 76 BPM | WEIGHT: 207.4 LBS | OXYGEN SATURATION: 98 % | HEIGHT: 74 IN | TEMPERATURE: 98.1 F | SYSTOLIC BLOOD PRESSURE: 128 MMHG | BODY MASS INDEX: 26.62 KG/M2 | DIASTOLIC BLOOD PRESSURE: 78 MMHG

## 2022-04-12 DIAGNOSIS — L02.214 ABSCESS OF RIGHT GROIN: Primary | ICD-10-CM

## 2022-04-12 PROCEDURE — 99212 OFFICE O/P EST SF 10 MIN: CPT | Performed by: SURGERY

## 2022-04-12 PROCEDURE — 10060 I&D ABSCESS SIMPLE/SINGLE: CPT | Performed by: SURGERY

## 2022-04-12 RX ORDER — EMTRICITABINE AND TENOFOVIR ALAFENAMIDE 200; 25 MG/1; MG/1
1 TABLET ORAL DAILY
COMMUNITY
Start: 2022-03-15

## 2022-04-12 RX ORDER — SULFAMETHOXAZOLE AND TRIMETHOPRIM 800; 160 MG/1; MG/1
1 TABLET ORAL EVERY 12 HOURS SCHEDULED
Qty: 14 TABLET | Refills: 0 | Status: SHIPPED | OUTPATIENT
Start: 2022-04-12 | End: 2022-04-19

## 2022-04-12 NOTE — PROGRESS NOTES
[facility] History and Physical Note:    Assessment:   right groin abscess  Drain in the clinic  Plan:  1  Bactrim 1 p o  b i d  x7 days two     2  Half-inch iodoform wick dressing changes b i d  3  Follow up next week for wound check    Chief Complaint:   "have another abscess"    HPI  Patient is a 70-year-old gentleman with a history MRSA positive skin infections last infection was in July 2021  He presents to the office now reporting gradual worsening infection right groin with some drainage this morning  PMH:  Past Medical History:   Diagnosis Date    Hyperlipidemia     Obesity        PSH:  Past Surgical History:   Procedure Laterality Date    APPENDECTOMY      KIDNEY SURGERY  2002    Donated left kidney       Home Meds:  Current Outpatient Medications on File Prior to Visit   Medication Sig Dispense Refill    cetirizine (ZyrTEC Allergy) 10 mg tablet 1 tablet daily      cholecalciferol (VITAMIN D3) 250 MCG (16451 UT) capsule 1 capsule daily      Cranberry-Vitamin C-Vitamin E (Cranberry Plus Vitamin C) 4200-20-3 MG-MG-UNIT CAPS 2 capsules daily      Krill Oil 500 MG CAPS 1 capsule daily      Multiple Vitamins-Minerals (Mens Multivitamin) TABS 1 tablet daily      neomycin-bacitracin-polymyxin b (NEOSPORIN) ointment Apply topically 2 (two) times a day 15 g 0     No current facility-administered medications on file prior to visit  Allergies:   Allergies   Allergen Reactions    Pollen Extract      Reaction Date: 21Nov2006;     Hibiclens [Chlorhexidine Gluconate] Rash       Social Hx:  Social History     Socioeconomic History    Marital status: /Civil Union     Spouse name: Not on file    Number of children: Not on file    Years of education: Not on file    Highest education level: Not on file   Occupational History    Not on file   Tobacco Use    Smoking status: Never Smoker    Smokeless tobacco: Never Used   Vaping Use    Vaping Use: Never used Substance and Sexual Activity    Alcohol use: Yes     Comment: OCCASIONALLY    Drug use: Never    Sexual activity: Not on file   Other Topics Concern    Not on file   Social History Narrative    Not on file     Social Determinants of Health     Financial Resource Strain: Not on file   Food Insecurity: Not on file   Transportation Needs: Not on file   Physical Activity: Not on file   Stress: Not on file   Social Connections: Not on file   Intimate Partner Violence: Not on file   Housing Stability: Not on file        Family Hx:    Family History   Problem Relation Age of Onset    Aneurysm Mother     Rheum arthritis Mother     Heart disease Father     Rheumatic fever Father     Kidney failure Father     Diabetes Brother     Kidney failure Brother     Stroke Brother          Review of Systems   Constitutional: Negative for chills and fever  Respiratory: Negative  Cardiovascular: Negative  Gastrointestinal: Negative  Genitourinary: Negative  Neurological: Negative  Hematological: Negative  All other systems reviewed and are negative  /78 (BP Location: Left arm, Patient Position: Sitting, Cuff Size: Large)   Pulse 76   Temp 98 1 °F (36 7 °C) (Core)   Ht 6' 2" (1 88 m)   Wt 94 1 kg (207 lb 6 4 oz)   SpO2 98%   BMI 26 63 kg/m²         Physical Exam  Vitals reviewed  Constitutional:       General: He is not in acute distress  Appearance: Normal appearance  Cardiovascular:      Rate and Rhythm: Normal rate  Pulmonary:      Effort: Pulmonary effort is normal  No respiratory distress  Abdominal:      General: Abdomen is flat  There is no distension  Skin:     General: Skin is warm and dry  Comments:   3 cm x 2 cm x 2 cm right groin abscess with surrounding erythema and some purulent       Neurological:      General: No focal deficit present  Mental Status: He is alert and oriented to person, place, and time        Incision and Drainage    Date/Time: 4/12/2022 8:32 AM  Performed by: Lauren Temple MD  Authorized by: Lauren Temple MD   C[place] Protocol:  Procedure performed by:  Consent: Written consent obtained  Consent given by: patient  Timeout called at: 4/12/2022 8:32 AM   Patient understanding: patient states understanding of the procedure being performed  Patient consent: the patient's understanding of the procedure matches consent given  Procedure consent: procedure consent matches procedure scheduled  Relevant documents: relevant documents present and verified  Test results: test results available and properly labeled  Site marked: the operative site was marked  Radiology Images displayed and confirmed  If images not available, report reviewed: imaging studies available  Patient identity confirmed: verbally with patient      Patient location:  Clinic  Location:     Type:  Abscess    Location:  [facility]  Pre-procedure details:     Skin preparation:  Techni-Care  Anesthesia (see MAR for exact dosages): Anesthesia method:  Local infiltration    Local anesthetic:  Lidocaine 1% WITH epi  Procedure details:     Complexity:  Intermediate    Incision types:  Elliptical    Scalpel blade:  15    Approach:  Open    Incision depth:  Subcutaneous    Wound management:  Probed and deloculated    Drainage:  Purulent    Packing materials:  1/2 in iodoform gauze  Post-procedure details:     Patient tolerance of procedure:   Tolerated well, no immediate complications        Pertinent labs reviewed    Pertinent images and available reads personally reviewed    Pertinent notes reviewed       Marianne Camacho MD [id number] [facility]  [phone]

## 2022-04-15 ENCOUNTER — TELEPHONE (OUTPATIENT)
Dept: SURGERY | Facility: CLINIC | Age: 57
End: 2022-04-15

## 2022-04-15 NOTE — TELEPHONE ENCOUNTER
Patient had I & D on abscess done on 4/12/2022  Patient states he is doing better, still packing incision  Patient does not have any questions or concerns at this time

## 2022-04-20 ENCOUNTER — OFFICE VISIT (OUTPATIENT)
Dept: SURGERY | Facility: CLINIC | Age: 57
End: 2022-04-20

## 2022-04-20 VITALS — WEIGHT: 207 LBS | BODY MASS INDEX: 26.56 KG/M2 | TEMPERATURE: 97 F | HEIGHT: 74 IN

## 2022-04-20 DIAGNOSIS — L08.9 INFECTION OF SKIN DUE TO METHICILLIN RESISTANT STAPHYLOCOCCUS AUREUS (MRSA): Primary | ICD-10-CM

## 2022-04-20 DIAGNOSIS — B95.62 INFECTION OF SKIN DUE TO METHICILLIN RESISTANT STAPHYLOCOCCUS AUREUS (MRSA): Primary | ICD-10-CM

## 2022-04-20 DIAGNOSIS — Z48.89 ENCOUNTER FOR SURGICAL FOLLOW-UP CARE: ICD-10-CM

## 2022-04-20 PROCEDURE — 99024 POSTOP FOLLOW-UP VISIT: CPT | Performed by: SPECIALIST

## 2022-04-20 NOTE — PROGRESS NOTES
General Surgery Office Visit Follow up   [facility]  Patient: Russ Will   : 1965 Sex: male MRN: [id number]   CSN: [id number] PCP: FABIOLA Storey    Assessment/ Plan:  Russ Will is a 64 y o  male  day(s) POD #  1 week S/p incision drainage of abscess right the groin  Infection of skin due to methicillin resistant Staphylococcus aureus (MRSA) [L08 9, B95 62]    Plan  Stable postop   Healing well no signs of infection cellulitis  Patient is on Bactrim DS   Local clean this and dry dressing  Complete the course of antibody    Follow-up with Dr Melanie Nj in 2 weeks    SUBJECTIVE:   I am doing well not much of depth for packing  OBJECTIVE:  No complaints  Minimal incisional pain  No fever no chills no rigors  Tolerating p o   Diet well  Normal bowel movement no constipation or diarrhea  Ambulating well   Vitals:   Temp (!) 97 °F (36 1 °C) (Core)   Ht 6' 2" (1 88 m)   Wt 93 9 kg (207 lb)   BMI 26 58 kg/m²     Active medications:    Current Outpatient Medications:     cetirizine (ZyrTEC Allergy) 10 mg tablet, 1 tablet daily, Disp: , Rfl:     cholecalciferol (VITAMIN D3) 250 MCG (58325 UT) capsule, 1 capsule daily, Disp: , Rfl:     Cranberry-Vitamin C-Vitamin E (Cranberry Plus Vitamin C) 4200-20-3 MG-MG-UNIT CAPS, 2 capsules daily, Disp: , Rfl:     Descovy 200-25 MG tablet, Take 1 tablet by mouth daily, Disp: , Rfl:     Krill Oil 500 MG CAPS, 1 capsule daily, Disp: , Rfl:     Multiple Vitamins-Minerals (Mens Multivitamin) TABS, 1 tablet daily, Disp: , Rfl:     neomycin-bacitracin-polymyxin b (NEOSPORIN) ointment, Apply topically 2 (two) times a day, Disp: 15 g, Rfl: 0    Physical Exam:   General Alert awake   Normocephalic atraumatic PERRLA   Lungs clear bilaterally  Cardiac normal S1 normal S2  Abdomen soft,non tender Bowel sounds present  Skin: surgical dressing is C/D/I  Ext: No clubbing, cyanosis, edema    Visit Diagnosis:   Diagnoses and all orders for this visit:    Infection of skin due to methicillin resistant Staphylococcus aureus (MRSA)    Encounter for surgical follow-up care       Plan of care was discussed with patient in detail    Pertinent labs reviewed  Most Recent Labs:   No visits with results within 2 Week(s) from this visit  Latest known visit with results is:   Hospital Outpatient Visit on 12/29/2020   Component Date Value Ref Range Status    Case Report 12/29/2020    Final                    Value:Surgical Pathology Report                         Case: [id number]                                   Authorizing Provider:  Yahir Barriga MD  Collected:           12/29/2020 1043              Ordering Location:     [facility]        Received:            12/29/2020 [address] [facility]                                                           Pathologist:           Rosi Ann MD                                                              Specimen:    Colon, cold forcep bx  - Cecum                                                              Final Diagnosis 12/29/2020    Final                    Value: This result contains rich text formatting which cannot be displayed here   Note 12/29/2020    Final                    Value: This result contains rich text formatting which cannot be displayed here   Additional Information 12/29/2020    Final                    Value: This result contains rich text formatting which cannot be displayed here  Jose Martin Gamma Description 12/29/2020    Final                    Value: This result contains rich text formatting which cannot be displayed here   Clinical Information 12/29/2020    Final                    Value:Mild erythema       Pertinent images and available reads personally reviewed  No results found  Pertinent notes reviewed    Counseling / Coordination of Care  Total Office time /unit time spent today 15minutes   Greater than 50% of total time was spent with the patient and / or family counseling and / or coordination of care  A description of the counseling / coordination of care:  I performed an interim history, pertinent images and labs, performed a physical examination to arrive at the plan delineated above with associated thought processes  Sean Knowles MD MS FRCS FACS  [facility]  04/20/22 9:05 AM        Portions of the record may have been created with voice recognition software  Occasional wrong word or "sound a like" substitutions may have occurred due to the inherent limitations of voice recognition software  Read the chart carefully and recognize, using context, where substitutions have occurred

## 2022-06-08 ENCOUNTER — OFFICE VISIT (OUTPATIENT)
Dept: SURGERY | Facility: CLINIC | Age: 57
End: 2022-06-08
Payer: COMMERCIAL

## 2022-06-08 ENCOUNTER — TELEPHONE (OUTPATIENT)
Dept: SURGERY | Facility: CLINIC | Age: 57
End: 2022-06-08

## 2022-06-08 VITALS
OXYGEN SATURATION: 98 % | BODY MASS INDEX: 26.26 KG/M2 | DIASTOLIC BLOOD PRESSURE: 76 MMHG | HEART RATE: 68 BPM | WEIGHT: 204.6 LBS | TEMPERATURE: 97.2 F | SYSTOLIC BLOOD PRESSURE: 128 MMHG | HEIGHT: 74 IN

## 2022-06-08 DIAGNOSIS — Z86.14 HISTORY OF MRSA INFECTION: ICD-10-CM

## 2022-06-08 DIAGNOSIS — B95.62 INFECTION OF SKIN DUE TO METHICILLIN RESISTANT STAPHYLOCOCCUS AUREUS (MRSA): ICD-10-CM

## 2022-06-08 DIAGNOSIS — L02.32 BOIL, BUTTOCK: Primary | ICD-10-CM

## 2022-06-08 DIAGNOSIS — L08.9 INFECTION OF SKIN DUE TO METHICILLIN RESISTANT STAPHYLOCOCCUS AUREUS (MRSA): ICD-10-CM

## 2022-06-08 PROCEDURE — 10060 I&D ABSCESS SIMPLE/SINGLE: CPT | Performed by: SPECIALIST

## 2022-06-08 PROCEDURE — 87070 CULTURE OTHR SPECIMN AEROBIC: CPT | Performed by: SPECIALIST

## 2022-06-08 PROCEDURE — 99214 OFFICE O/P EST MOD 30 MIN: CPT | Performed by: SPECIALIST

## 2022-06-08 PROCEDURE — 87186 SC STD MICRODIL/AGAR DIL: CPT | Performed by: SPECIALIST

## 2022-06-08 PROCEDURE — 87205 SMEAR GRAM STAIN: CPT | Performed by: SPECIALIST

## 2022-06-08 RX ORDER — IBUPROFEN 200 MG
200 TABLET ORAL EVERY 6 HOURS PRN
Qty: 12 TABLET | Refills: 0 | Status: SHIPPED | OUTPATIENT
Start: 2022-06-08 | End: 2022-06-11

## 2022-06-08 RX ORDER — SULFAMETHOXAZOLE AND TRIMETHOPRIM 800; 160 MG/1; MG/1
1 TABLET ORAL EVERY 12 HOURS SCHEDULED
Qty: 20 TABLET | Refills: 0 | Status: SHIPPED | OUTPATIENT
Start: 2022-06-08 | End: 2022-06-18

## 2022-06-08 NOTE — PROGRESS NOTES
History and Physical Examination - General Surgery   [facility]  Bri Dupont 64 y o  male MRN: [id number]  : [id number] PCP: FBAIOLA Zimmerman    History of Present Illness   Chief Complaint:    Painful boil draining pus left buttock over the presacral area    HPI:  Bri Dupont is a 64 y o  male who presents to office with history of recurrent infection lap the another abscess the left buttock over the one-week     Patient is not on any antibiotics the is partially ruptured and leaking pus    Denies any fever chills rigors    Patient has MRSA colonization and cannot use the Hibiclens due to allergic reaction    Historical Information   The following portions of the patient's history were reviewed and updated as appropriate  Past Medical History:   Diagnosis Date    Hyperlipidemia     Obesity      Past Surgical History:   Procedure Laterality Date    APPENDECTOMY      KIDNEY SURGERY  2002    Donated left kidney     Social History   Social History     Substance and Sexual Activity   Alcohol Use Yes    Comment: OCCASIONALLY     Social History     Substance and Sexual Activity   Drug Use Never     Social History     Tobacco Use   Smoking Status Never Smoker   Smokeless Tobacco Never Used     Family History:   Family History   Problem Relation Age of Onset    Aneurysm Mother     Rheum arthritis Mother     Heart disease Father     Rheumatic fever Father     Kidney failure Father     Diabetes Brother     Kidney failure Brother     Stroke Brother        Meds/Allergies   Allergies   Allergen Reactions    Pollen Extract      Reaction Date: 21Nov2006;     Hibiclens [Chlorhexidine Gluconate] Rash       Current Outpatient Medications:     cetirizine (ZyrTEC) 10 mg tablet, 1 tablet daily, Disp: , Rfl:     cholecalciferol (VITAMIN D3) 250 MCG (99654 UT) capsule, 1 capsule daily, Disp: , Rfl:     Cranberry-Vitamin C-Vitamin E (Cranberry Plus Vitamin C) 4200-20-3 MG-MG-UNIT CAPS, 2 capsules daily, Disp: , Rfl:     Descovy 200-25 MG tablet, Take 1 tablet by mouth daily, Disp: , Rfl:     Krill Oil 500 MG CAPS, 1 capsule daily, Disp: , Rfl:     Multiple Vitamins-Minerals (Mens Multivitamin) TABS, 1 tablet daily, Disp: , Rfl:      REVIEW OF SYSTEMS  Constitutional:  Denies fever or chills   Eyes:  Denies change in visual acuity   HENT:  Denies nasal congestion or sore throat   Respiratory:  Denies cough or shortness of breath   Cardiovascular:  Denies chest pain or edema   GI:  Denies abdominal pain, nausea, vomiting, bloody stools or diarrhea   :  Denies dysuria, frequency, difficulty in micturition and nocturia  Musculoskeletal:  Denies back pain or joint pain   Neurologic:  Denies headache, focal weakness or sensory changes   Endocrine:  Denies polyuria or polydipsia   Lymphatic:  Denies swollen glands   Psychiatric:  Denies depression or anxiety     Objective   Current Vitals:   /76 (BP Location: Left arm, Patient Position: Sitting, Cuff Size: Large)   Pulse 68   Temp (!) 97 2 °F (36 2 °C) (Core)   Ht 6' 2" (1 88 m)   Wt 92 8 kg (204 lb 9 6 oz)   SpO2 98%   BMI 26 27 kg/m²   Body mass index is 26 27 kg/m²  PHYSICAL EXAMS  General:  Patient is not in acute distress, laying in the bed comfortably, awake, alert responding to commands,   HEENT:  Both pupils normal-size atraumatic, normocephalic, nonicteric  Neck:  JVP not raised   Trachea central  Respiratory:  normal Breath sounds Cardiovascular:  S1-S2   GI:  Abdomen soft   Musculoskeletal:  No back pain  Integument:  No skin rashes or ulceration  Lymphatic:  No cervical  lymphadenopathy  Neurologic:  Patient is awake alert, responding to command, well-oriented to time and place and person moving all extremities ambulating well    Left protect near the cleft presacral area 3 cm x 4 cm size abscess with pus leaking out from small pinhole opening  Redness inflamed with minimal cellulitis the 1 cm around the abscess    Visit Diagnosis:   Diagnoses and all orders for this visit:    Boil, buttock    Infection of skin due to methicillin resistant Staphylococcus aureus (MRSA)    History of MRSA infection       Plan of care was discussed with patient in detail    Pertinent labs reviewed  Pertinent images and available reads personally reviewed  No results found  Pertinent notes reviewed    Assessment/Plan   Assessment:  Recurrent skin infection with abscess  MRSA colonization    Plan:  Incision and drainage of abscess  Under local anesthesia    Packing with iodophor quadrant inch    Removal of packing tomorrow  Local dressing with the Bactroban ointment  Back trimmed DS 1 tablet twice a day for 2 weeks    Take shower with the Detol antiseptic soap  As patient has allergic reaction to Hibiclens    Follow-up in 1 week or earlier if no improvement    Tylenol/a Motrin over-the-counter for pain       Counseling / Coordination of Care  Expained patient in detailed about coordination of care  A description of the counseling / coordination of care:  I performed an interim history, pertinent images and labs, performed a physical examination to arrive at the plan delineated above with associated thought processes  MD Esteban Cadena    Office   Tel  [phone]  Fax   [phone]

## 2022-06-08 NOTE — PROGRESS NOTES
Incision and Drainage    Date/Time: 6/8/2022 12:02 PM  Performed by: Sean Knowles MD  Authorized by: Sean Knowles MD   Universal Protocol:  Consent: Verbal consent obtained  Risks and benefits: risks, benefits and alternatives were discussed  Consent given by: patient  Time out: Immediately prior to procedure a "time out" was called to verify the correct patient, procedure, equipment, support staff and site/side marked as required  Timeout called at: 6/8/2022 12:02 PM   Patient understanding: patient states understanding of the procedure being performed  Patient consent: the patient's understanding of the procedure matches consent given  Procedure consent: procedure consent matches procedure scheduled  Relevant documents: relevant documents present and verified  Site marked: the operative site was marked  Patient identity confirmed: verbally with patient      Patient location:  Clinic  Location:     Type:  Abscess    Location:  Anogenital    Anogenital location:  Gluteal cleft  Pre-procedure details:     Skin preparation:  Betadine  Anesthesia (see MAR for exact dosages): Anesthesia method:  Local infiltration    Local anesthetic:  Bupivacaine 0 25% WITH epi  Procedure details:     Complexity:  Simple    Incision types:  Stab incision and single with marsupialization    Scalpel blade:  11    Approach:  Open    Incision depth:  Subcutaneous    Wound management:  Probed and deloculated and irrigated with saline    Drainage:  Purulent    Drainage amount: Moderate    Wound treatment:  Packing placed    Packing materials:  1/4 in iodoform gauze  Post-procedure details:     Patient tolerance of procedure:   Tolerated well, no immediate complications

## 2022-06-10 LAB
BACTERIA WND AEROBE CULT: ABNORMAL
GRAM STN SPEC: ABNORMAL
GRAM STN SPEC: ABNORMAL

## 2022-06-15 ENCOUNTER — OFFICE VISIT (OUTPATIENT)
Dept: SURGERY | Facility: CLINIC | Age: 57
End: 2022-06-15

## 2022-06-15 VITALS
OXYGEN SATURATION: 98 % | WEIGHT: 203 LBS | TEMPERATURE: 98.4 F | SYSTOLIC BLOOD PRESSURE: 120 MMHG | DIASTOLIC BLOOD PRESSURE: 78 MMHG | RESPIRATION RATE: 18 BRPM | BODY MASS INDEX: 26.05 KG/M2 | HEART RATE: 60 BPM | HEIGHT: 74 IN

## 2022-06-15 DIAGNOSIS — Z22.322 MRSA (METHICILLIN RESISTANT STAPH AUREUS) CULTURE POSITIVE: Primary | ICD-10-CM

## 2022-06-15 DIAGNOSIS — L02.31 ABSCESS OF BUTTOCK, RIGHT: ICD-10-CM

## 2022-06-15 PROCEDURE — 99024 POSTOP FOLLOW-UP VISIT: CPT | Performed by: SURGERY

## 2022-06-15 RX ORDER — SULFAMETHOXAZOLE AND TRIMETHOPRIM 800; 160 MG/1; MG/1
1 TABLET ORAL EVERY 12 HOURS SCHEDULED
Qty: 14 TABLET | Refills: 0 | Status: SHIPPED | OUTPATIENT
Start: 2022-06-15 | End: 2022-06-22

## 2022-06-15 NOTE — PROGRESS NOTES
Assessment/Plan:  The cultures showed MRSA  I gave him 1 more week of Bactrim  I explained to him that he should take Sitz bath daily  Follow-up with me or Dr Munroe prn  No problem-specific Assessment & Plan notes found for this encounter  Diagnoses and all orders for this visit:    MRSA (methicillin resistant staph aureus) culture positive  -     sulfamethoxazole-trimethoprim (BACTRIM DS) 800-160 mg per tablet; Take 1 tablet by mouth every 12 (twelve) hours for 7 days    Abscess of buttock, right          Subjective:      Patient ID: Marcell Haynes is a 64 y o  male  59-year-old male patient came to my office for follow-up  He had an abscess drained by Dr Munroe 1 week ago  Patient has had multiple abscesses in the past   The latest cultures from the abscess shows growth of MRSA  Patient has taken 1 week of Bactrim  He says that he has no pain now  He is doing his dressings daily but says that there is no drainage  The following portions of the patient's history were reviewed and updated as appropriate: allergies, current medications, past family history, past medical history, past social history, past surgical history and problem list     Review of Systems   All other systems reviewed and are negative  Objective:      /78 (BP Location: Left arm, Patient Position: Sitting, Cuff Size: Standard)   Pulse 60   Temp 98 4 °F (36 9 °C) (Tympanic)   Resp 18   Ht 6' 2" (1 88 m)   Wt 92 1 kg (203 lb)   SpO2 98%   BMI 26 06 kg/m²          Physical Exam  Vitals reviewed  Constitutional:       Appearance: Normal appearance  HENT:      Head: Normocephalic  Nose: Nose normal       Mouth/Throat:      Mouth: Mucous membranes are moist    Cardiovascular:      Rate and Rhythm: Normal rate and regular rhythm  Genitourinary:     Comments: Over the left buttock there is a small area of induration  No cellulitis    There is a small opening which I probed with Q-tip and there was no drainage  The cavity is very shallow  I changed the dressing  Musculoskeletal:      Cervical back: Normal range of motion  Neurological:      Mental Status: He is alert

## 2022-06-15 NOTE — PROGRESS NOTES
Assessment/Plan:    No problem-specific Assessment & Plan notes found for this encounter  {Assess/PlanSmartLinks:96817}      Subjective:      Patient ID: Cass Zeng is a 64 y o  male      HPI    {Common ambulatory SmartLinks:49324}    Review of Systems      Objective:      /78 (BP Location: Left arm, Patient Position: Sitting, Cuff Size: Standard)   Pulse 60   Temp 98 4 °F (36 9 °C) (Tympanic)   Resp 18   Ht 6' 2" (1 88 m)   Wt 92 1 kg (203 lb)   SpO2 98%   BMI 26 06 kg/m²          Physical Exam